# Patient Record
Sex: MALE | Race: AMERICAN INDIAN OR ALASKA NATIVE
[De-identification: names, ages, dates, MRNs, and addresses within clinical notes are randomized per-mention and may not be internally consistent; named-entity substitution may affect disease eponyms.]

---

## 2022-05-16 ENCOUNTER — HOSPITAL ENCOUNTER (EMERGENCY)
Dept: HOSPITAL 5 - ED | Age: 32
LOS: 1 days | Discharge: LEFT BEFORE DISCHARGE | End: 2022-05-17
Payer: SELF-PAY

## 2022-05-16 VITALS — DIASTOLIC BLOOD PRESSURE: 100 MMHG | SYSTOLIC BLOOD PRESSURE: 123 MMHG

## 2022-05-16 DIAGNOSIS — Z53.21: ICD-10-CM

## 2022-05-16 DIAGNOSIS — R07.89: Primary | ICD-10-CM

## 2022-05-16 LAB
ALBUMIN SERPL-MCNC: 5 G/DL (ref 3.9–5)
ALT SERPL-CCNC: 25 UNITS/L (ref 7–56)
BUN SERPL-MCNC: 14 MG/DL (ref 9–20)
BUN/CREAT SERPL: 12 %
CALCIUM SERPL-MCNC: 10.7 MG/DL (ref 8.4–10.2)
HCT VFR BLD CALC: 46.8 % (ref 35.5–45.6)
HEMOLYSIS INDEX: 7
HGB BLD-MCNC: 16.1 GM/DL (ref 11.8–15.2)
MCHC RBC AUTO-ENTMCNC: 34 % (ref 32–34)
MCV RBC AUTO: 93 FL (ref 84–94)
PLATELET # BLD: 172 K/MM3 (ref 140–440)
RBC # BLD AUTO: 5.03 M/MM3 (ref 3.65–5.03)

## 2022-05-16 PROCEDURE — 71046 X-RAY EXAM CHEST 2 VIEWS: CPT

## 2022-05-16 PROCEDURE — 80053 COMPREHEN METABOLIC PANEL: CPT

## 2022-05-16 PROCEDURE — 84484 ASSAY OF TROPONIN QUANT: CPT

## 2022-05-16 PROCEDURE — 85027 COMPLETE CBC AUTOMATED: CPT

## 2022-05-16 PROCEDURE — 36415 COLL VENOUS BLD VENIPUNCTURE: CPT

## 2022-05-16 NOTE — XRAY REPORT
CHEST 2 VIEWS 



INDICATION / CLINICAL INFORMATION:

chest pain.



COMPARISON: 

None available.



FINDINGS:



SUPPORT DEVICES: None.



HEART / MEDIASTINUM: No significant abnormality. 



LUNGS / PLEURA: No significant pulmonary or pleural abnormality. No pneumothorax. 



ADDITIONAL FINDINGS: No significant additional findings.



IMPRESSION:

1. No acute findings.



Signer Name: Carmen Martínez MD 

Signed: 5/16/2022 7:09 PM

Workstation Name: [id number]

## 2022-05-28 ENCOUNTER — HOSPITAL ENCOUNTER (EMERGENCY)
Dept: HOSPITAL 5 - ED | Age: 32
Discharge: HOME | End: 2022-05-28
Payer: SELF-PAY

## 2022-05-28 VITALS — DIASTOLIC BLOOD PRESSURE: 80 MMHG | SYSTOLIC BLOOD PRESSURE: 149 MMHG

## 2022-05-28 DIAGNOSIS — Z79.899: ICD-10-CM

## 2022-05-28 DIAGNOSIS — F43.9: Primary | ICD-10-CM

## 2022-05-28 LAB
ALBUMIN SERPL-MCNC: 4.8 G/DL (ref 3.9–5)
ALT SERPL-CCNC: 36 UNITS/L (ref 7–56)
BASOPHILS # (AUTO): 0.1 K/MM3 (ref 0–0.1)
BASOPHILS NFR BLD AUTO: 1.3 % (ref 0–1.8)
BILIRUB UR QL STRIP: (no result)
BLOOD UR QL VISUAL: (no result)
BUN SERPL-MCNC: 13 MG/DL (ref 9–20)
BUN/CREAT SERPL: 11 %
CALCIUM SERPL-MCNC: 11 MG/DL (ref 8.4–10.2)
EOSINOPHIL # BLD AUTO: 0.1 K/MM3 (ref 0–0.4)
EOSINOPHIL NFR BLD AUTO: 0.8 % (ref 0–4.3)
HCT VFR BLD CALC: 47.7 % (ref 35.5–45.6)
HEMOLYSIS INDEX: 10
HGB BLD-MCNC: 15.7 GM/DL (ref 11.8–15.2)
LYMPHOCYTES # BLD AUTO: 2.1 K/MM3 (ref 1.2–5.4)
LYMPHOCYTES NFR BLD AUTO: 33.9 % (ref 13.4–35)
MCHC RBC AUTO-ENTMCNC: 33 % (ref 32–34)
MCV RBC AUTO: 97 FL (ref 84–94)
MONOCYTES # (AUTO): 0.5 K/MM3 (ref 0–0.8)
MONOCYTES % (AUTO): 8.4 % (ref 0–7.3)
PH UR STRIP: 5 [PH] (ref 5–7)
PLATELET # BLD: 190 K/MM3 (ref 140–440)
PROT UR STRIP-MCNC: (no result) MG/DL
RBC # BLD AUTO: 4.94 M/MM3 (ref 3.65–5.03)
RBC #/AREA URNS HPF: 4 /HPF (ref 0–6)
UROBILINOGEN UR-MCNC: < 2 MG/DL (ref ?–2)
WBC #/AREA URNS HPF: 2 /HPF (ref 0–6)

## 2022-05-28 PROCEDURE — 85025 COMPLETE CBC W/AUTO DIFF WBC: CPT

## 2022-05-28 PROCEDURE — 99283 EMERGENCY DEPT VISIT LOW MDM: CPT

## 2022-05-28 PROCEDURE — 80307 DRUG TEST PRSMV CHEM ANLYZR: CPT

## 2022-05-28 PROCEDURE — 80320 DRUG SCREEN QUANTALCOHOLS: CPT

## 2022-05-28 PROCEDURE — 36415 COLL VENOUS BLD VENIPUNCTURE: CPT

## 2022-05-28 PROCEDURE — 80053 COMPREHEN METABOLIC PANEL: CPT

## 2022-05-28 PROCEDURE — 71046 X-RAY EXAM CHEST 2 VIEWS: CPT

## 2022-05-28 PROCEDURE — 81001 URINALYSIS AUTO W/SCOPE: CPT

## 2022-05-28 PROCEDURE — G0480 DRUG TEST DEF 1-7 CLASSES: HCPCS

## 2022-05-28 PROCEDURE — 84484 ASSAY OF TROPONIN QUANT: CPT

## 2022-05-28 PROCEDURE — 93005 ELECTROCARDIOGRAM TRACING: CPT

## 2022-05-28 NOTE — XRAY REPORT
CHEST 2 VIEWS 



INDICATION: 

dizziness.



COMPARISON: 

5/16/2022



FINDINGS:



SUPPORT DEVICES: None.



HEART: Within normal limits. 



LUNGS/PLEURA: No acute air space or interstitial disease.  No pneumothorax.



ADDITIONAL FINDINGS: None.







IMPRESSION:

1. No acute findings.



Signer Name: Carlito Roman MD 

Signed: 5/28/2022 3:49 PM

Workstation Name: [id number]

## 2022-05-28 NOTE — EMERGENCY DEPARTMENT REPORT
ED Medical Clearance HPI





- General


Chief complaint: Medical Clearance


Stated complaint: GENERAL ILLNESS


Time Seen by Provider: 05/28/22 15:20


Source: patient


Mode of arrival: Ambulatory





- History of Present Illness


Initial comments: 


Patient 32-year-old male with a history of anxiety who presents for anxiety and 

states arm shaking patient denies SI or HI does endorse anxiety.  There is no 

nausea no vomiting no chest pain no fevers or chills patient denies substance.  

States he was here 1 week ago for same but could not however did not wait for 

results as he wants to complete evaluation at this time.  Symptoms are 

exacerbated by stress.  Symptoms are relieved by nothing tried.  Patient denies 

other psych history


MD Complaint: medical clearance request


Home medications: 


                                  Previous Rx's











 Medication  Instructions  Recorded  Last Taken  Type


 


Albuterol Mdi (or & Nicu Only) 2 puff IH QID PRN #8.5 gram 05/28/22 Unknown Rx





[ProAir HFA Inhaler]    











Allergies/Adverse reactions: 


                                    Allergies











Allergy/AdvReac Type Severity Reaction Status Date / Time


 


No Known Allergies Allergy   Verified 05/28/22 14:42














ED Review of Systems


ROS: 


Stated complaint: GENERAL ILLNESS


Other details as noted in HPI





Constitutional: denies: chills, fever


Eyes: denies: eye pain, eye discharge, vision change


ENT: denies: ear pain, throat pain


Respiratory: denies: cough, shortness of breath, wheezing


Cardiovascular: as per HPI


Endocrine: no symptoms reported


Gastrointestinal: denies: abdominal pain, nausea, diarrhea


Genitourinary: denies: urgency, dysuria


Musculoskeletal: denies: back pain, joint swelling, arthralgia


Skin: denies: rash, lesions


Neurological: denies: headache, weakness, numbness, paresthesias, confusion, 

vertigo


Psychiatric: anxiety.  denies: depression, auditory hallucinations, visual 

hallucinations, homicidal thoughts, suicidal thoughts


Hematological/Lymphatic: denies: easy bleeding, easy bruising





ED Past Medical Hx





- Past Medical History


Hx Asthma: Yes





- Medications


Home Medications: 


                                Home Medications











 Medication  Instructions  Recorded  Confirmed  Last Taken  Type


 


Albuterol Mdi (or & Nicu Only) 2 puff IH QID PRN #8.5 gram 05/28/22  Unknown Rx





[ProAir HFA Inhaler]     














ED Physical Exam





- General


Limitations: No Limitations


General appearance: alert, in no apparent distress





- Head


Head exam: Present: normocephalic, normal inspection





- Eye


Eye exam: Present: normal appearance, PERRL, EOMI


Pupils: Present: normal accommodation





- ENT


ENT exam: Present: mucous membranes moist





- Neck


Neck exam: Present: normal inspection, full ROM.  Absent: tenderness, 

lymphadenopathy





- Respiratory


Respiratory exam: Present: normal lung sounds bilaterally.  Absent: respiratory 

distress, wheezes, chest wall tenderness





- Cardiovascular


Cardiovascular Exam: Present: regular rate, normal rhythm, normal heart sounds. 

Absent: systolic murmur, diastolic murmur, rubs, gallop





- GI/Abdominal


GI/Abdominal exam: Present: soft, normal bowel sounds.  Absent: distended, 

tenderness, guarding, rebound, rigid, bruit, hernia





- Rectal


Rectal exam: Present: deferred





- Extremities Exam


Extremities exam: Present: normal inspection, full ROM, normal capillary refill.

 Absent: tenderness





- Back Exam


Back exam: Present: normal inspection, full ROM.  Absent: tenderness, CVA 

tenderness (R), CVA tenderness (L)





- Neurological Exam


Neurological exam: Present: alert, oriented X3, CN II-XII intact, normal gait, 

reflexes normal.  Absent: motor sensory deficit





- Expanded Neurological Exam


  ** Expanded


Patient oriented to: Present: person, place, time


Speech: Present: fluid speech


Motor strength exam: RUE: 5, LUE: 5, RLE: 5, LLE: 5


Best Eye Response ([place]): (4) open spontaneously


Best Motor Response ([place]): (6) obeys commands


Best Verbal Response ([place]): (5) oriented


[place] Total: 15





- Psychiatric


Psychiatric exam: Present: normal affect, normal mood





- Skin


Skin exam: Present: warm, dry, intact, normal color.  Absent: rash





ED Course


                                   Vital Signs











  05/28/22





  14:34


 


Temperature 98 F


 


Pulse Rate 90


 


Respiratory 18





Rate 


 


Blood Pressure 149/80





[Left] 


 


O2 Sat by Pulse 100





Oximetry 














ED Medical Decision Making





- Lab Data


Result diagrams: 


                                 05/28/22 15:31





                                 05/28/22 15:31








Labs











  05/28/22 05/28/22 05/28/22





  15:31 15:31 15:31


 


WBC  6.3  


 


Hgb  15.7 H  


 


Lymph % (Auto)  33.9  


 


Mono % (Auto)  8.4 H  


 


Eos % (Auto)  0.8  


 


Baso % (Auto)  1.3  


 


Lymph # (Auto)  2.1  


 


Mono # (Auto)  0.5  


 


Eos # (Auto)  0.1  


 


Baso # (Auto)  0.1  


 


Seg Neutrophils %  55.6  


 


Seg Neutrophils #  3.5  


 


Sodium   140 


 


Potassium   4.2 


 


Chloride   101.7 


 


Carbon Dioxide   24 


 


Anion Gap   19 


 


BUN   13 


 


Creatinine   1.2 


 


Estimated GFR   > 60 


 


BUN/Creatinine Ratio   11 


 


Glucose   84 


 


Calcium   11.0 H 


 


Total Bilirubin   0.20 


 


AST   41 H 


 


ALT   36 


 


Alkaline Phosphatase   121 


 


Troponin T   


 


Total Protein   7.9 


 


Albumin   4.8 


 


Albumin/Globulin Ratio   1.5 


 


Urine Color   


 


Urine Turbidity   


 


Urine pH   


 


Ur Specific Gravity   


 


Urine Protein   


 


Urine Glucose (UA)   


 


Urine Ketones   


 


Urine Blood   


 


Urine Nitrite   


 


Urine Bilirubin   


 


Urine Urobilinogen   


 


Ur Leukocyte Esterase   


 


Urine WBC (Auto)   


 


Urine RBC (Auto)   


 


Salicylates    < 0.3 L


 


Urine Opiates Screen   


 


Urine Methadone Screen   


 


Acetaminophen   


 


Ur Barbiturates Screen   


 


Ur Phencyclidine Scrn   


 


Ur Amphetamines Screen   


 


U Benzodiazepines Scrn   


 


Urine Cocaine Screen   














  05/28/22 05/28/22 05/28/22





  15:31 15:31 Unknown


 


WBC   


 


Hgb   


 


Lymph % (Auto)   


 


Mono % (Auto)   


 


Eos % (Auto)   


 


Baso % (Auto)   


 


Lymph # (Auto)   


 


Mono # (Auto)   


 


Eos # (Auto)   


 


Baso # (Auto)   


 


Seg Neutrophils %   


 


Seg Neutrophils #   


 


Sodium   


 


Potassium   


 


Chloride   


 


Carbon Dioxide   


 


Anion Gap   


 


BUN   


 


Creatinine   


 


Estimated GFR   


 


BUN/Creatinine Ratio   


 


Glucose   


 


Calcium   


 


Total Bilirubin   


 


AST   


 


ALT   


 


Alkaline Phosphatase   


 


Troponin T   < 0.010 


 


Total Protein   


 


Albumin   


 


Albumin/Globulin Ratio   


 


Urine Color    Yellow


 


Urine Turbidity    Clear


 


Urine pH    5.0


 


Ur Specific Gravity    1.015


 


Urine Protein    <15 mg/dl


 


Urine Glucose (UA)    Neg


 


Urine Ketones    Tr


 


Urine Blood    Neg


 


Urine Nitrite    Neg


 


Urine Bilirubin    Neg


 


Urine Urobilinogen    < 2.0


 


Ur Leukocyte Esterase    Neg


 


Urine WBC (Auto)    2.0


 


Urine RBC (Auto)    4.0


 


Salicylates   


 


Urine Opiates Screen   


 


Urine Methadone Screen   


 


Acetaminophen  5.0 L  


 


Ur Barbiturates Screen   


 


Ur Phencyclidine Scrn   


 


Ur Amphetamines Screen   


 


U Benzodiazepines Scrn   


 


Urine Cocaine Screen   














  05/28/22





  Unknown


 


WBC 


 


Hgb 


 


Lymph % (Auto) 


 


Mono % (Auto) 


 


Eos % (Auto) 


 


Baso % (Auto) 


 


Lymph # (Auto) 


 


Mono # (Auto) 


 


Eos # (Auto) 


 


Baso # (Auto) 


 


Seg Neutrophils % 


 


Seg Neutrophils # 


 


Sodium 


 


Potassium 


 


Chloride 


 


Carbon Dioxide 


 


Anion Gap 


 


BUN 


 


Creatinine 


 


Estimated GFR 


 


BUN/Creatinine Ratio 


 


Glucose 


 


Calcium 


 


Total Bilirubin 


 


AST 


 


ALT 


 


Alkaline Phosphatase 


 


Troponin T 


 


Total Protein 


 


Albumin 


 


Albumin/Globulin Ratio 


 


Urine Color 


 


Urine Turbidity 


 


Urine pH 


 


Ur Specific Gravity 


 


Urine Protein 


 


Urine Glucose (UA) 


 


Urine Ketones 


 


Urine Blood 


 


Urine Nitrite 


 


Urine Bilirubin 


 


Urine Urobilinogen 


 


Ur Leukocyte Esterase 


 


Urine WBC (Auto) 


 


Urine RBC (Auto) 


 


Salicylates 


 


Urine Opiates Screen  Negative


 


Urine Methadone Screen  Negative


 


Acetaminophen 


 


Ur Barbiturates Screen  Negative


 


Ur Phencyclidine Scrn  Negative


 


Ur Amphetamines Screen  Negative


 


U Benzodiazepines Scrn  Negative


 


Urine Cocaine Screen  Negative














- EKG Data


-: EKG Interpreted by Me


EKG shows normal: sinus rhythm, axis, intervals, QRS complexes, ST-T waves


Rate: normal





- EKG Data


When compared to previous EKG there are: no significant change


Interpretation: no acute changes (Normal sinus rhythm no ST elevated MI 

interpreted by ED attending no change from previous EKGs.)





- Radiology Data


Radiology results: report reviewed, image reviewed


CHEST 2 VIEWS   


 


 INDICATION:   


 dizziness.  


 


 COMPARISON:   


 5/16/2022  


 


 FINDINGS:  


 


 SUPPORT DEVICES: None.  


 


 HEART: Within normal limits.   


 


 LUNGS/PLEURA: No acute air space or interstitial disease.  No pneumothorax.  


 


 ADDITIONAL FINDINGS: None.  


 


 


 


 IMPRESSION:  


 1. No acute findings.  


 


 Signer Name: Carlito Roman MD   


 Signed: 5/28/2022 3:49 PM  


 Workstation Name: [id number]   


 


 


Transcribed By: LACEY  


Dictated By: Carlito Roman MD  


Electronically Authenticated By: Carlito Roman MD    


Signed Date/Time: 05/28/22 1549                                


 


 


 


DD/DT: 05/28/22 1549                                                            

  


TD/TT:


























- Medical Decision Making


EKG is normal troponin is normal negative heart score is 0 URIEL score is 0 chest

 x-ray is normal no infiltrates no opacities.  Symptoms are resolved at this 

time labs are non-actionable.  Patient is tolerating p.o. hydration without 

symptoms.  Denies pain denies anxiety there is no tremor no symptoms of 

withdrawal no nausea no vomiting no fever chills.  Patient will follow-up 

primary care doctor in 2 to 3 days.  Patient will return to emergency department

 should symptoms worsen.








ED Disposition


Clinical Impression: 


 Stress





Disposition: 01 HOME / SELF CARE / HOMELESS


Is pt being admited?: No


Does the pt Need Aspirin: No


Condition: Stable


Instructions:  Alcohol Use Disorder


Additional Instructions: 


take all medications as prescribed, hydrate as directed, followup with your 

doctor in 2-3 days, return to emergency if symptoms worsen.


Prescriptions: 


Albuterol Mdi (or & Nicu Only) [ProAir HFA Inhaler] 2 puff IH QID PRN #8.5 gram


 PRN Reason: Shortness Of Breath


Referrals: 


REBEKA FARMER MD [Staff Physician] - 3-5 Days


[facility] [Provider Group] - 3-5 Days


Forms:  Work/School Release Form(ED)


Time of Disposition: 17:20